# Patient Record
Sex: FEMALE | Race: OTHER | NOT HISPANIC OR LATINO
[De-identification: names, ages, dates, MRNs, and addresses within clinical notes are randomized per-mention and may not be internally consistent; named-entity substitution may affect disease eponyms.]

---

## 2018-02-28 PROBLEM — Z00.00 ENCOUNTER FOR PREVENTIVE HEALTH EXAMINATION: Status: ACTIVE | Noted: 2018-02-28

## 2018-03-29 ENCOUNTER — APPOINTMENT (OUTPATIENT)
Dept: OPHTHALMOLOGY | Facility: CLINIC | Age: 78
End: 2018-03-29

## 2018-12-20 ENCOUNTER — APPOINTMENT (OUTPATIENT)
Dept: OPHTHALMOLOGY | Facility: CLINIC | Age: 78
End: 2018-12-20

## 2019-07-31 ENCOUNTER — APPOINTMENT (OUTPATIENT)
Dept: PHYSICAL MEDICINE AND REHAB | Facility: CLINIC | Age: 79
End: 2019-07-31
Payer: MEDICARE

## 2019-07-31 VITALS — BODY MASS INDEX: 20.99 KG/M2 | HEIGHT: 58 IN | WEIGHT: 100 LBS

## 2019-07-31 DIAGNOSIS — M72.2 PLANTAR FASCIAL FIBROMATOSIS: ICD-10-CM

## 2019-07-31 PROCEDURE — 99204 OFFICE O/P NEW MOD 45 MIN: CPT

## 2019-07-31 NOTE — ASSESSMENT
[FreeTextEntry1] : We will begin with conservative management including physical therapy and oral OTC anti-inflammatories. If patient has not improved after 6 weeks consider MRI imaging at that time.\par

## 2019-07-31 NOTE — HISTORY OF PRESENT ILLNESS
[FreeTextEntry1] : PATIENT IS BEING SEEN FOR INITIAL EVALUATION FOR LOWER BACK PAIN. 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 LAYING DOWN FLAT, AND ICING AREA  ALLEVIATE THE PAIN. CURRENT PAIN LEVEL 4/10.

## 2019-07-31 NOTE — PHYSICAL EXAM
[Normal Station and Gait] : the gait and station were normal [Normal Motor Tone] : the muscle tone was normal [Involuntary Movements] : no involuntary movements were seen [Normal] : Oriented to person, place, and time, insight and judgement were intact and the affect was normal [de-identified] : No gross deformity, TTP of the lumbar paraspinals:right/bilaterally, ROM limited with flexion, SLR positive:right

## 2019-08-26 ENCOUNTER — APPOINTMENT (OUTPATIENT)
Dept: PHYSICAL MEDICINE AND REHAB | Facility: CLINIC | Age: 79
End: 2019-08-26
Payer: MEDICARE

## 2019-08-26 VITALS — HEIGHT: 58 IN | WEIGHT: 100 LBS | BODY MASS INDEX: 20.99 KG/M2

## 2019-08-26 PROCEDURE — 99213 OFFICE O/P EST LOW 20 MIN: CPT

## 2019-08-28 NOTE — HISTORY OF PRESENT ILLNESS
[FreeTextEntry1] : Location: low back pain\par Quality:sharp\par  current pain level 3/10\par Duration:2 months \par Context: patient states pain is radiating into her right hip and down the front of the leg \par Aggravating Factors: getting up, changing positions, lying down \par Conservative treatment:heat and ice, \par Associated Symptoms:  stiff \par Prior Studies:\par

## 2019-08-28 NOTE — ASSESSMENT
[FreeTextEntry1] : Did not like last PT place, before ordering an MRI will try PT with [facility] first.

## 2019-08-28 NOTE — PHYSICAL EXAM
[Normal Station and Gait] : the gait and station were normal [Normal Motor Tone] : the muscle tone was normal [Involuntary Movements] : no involuntary movements were seen [Normal] : Oriented to person, place, and time, insight and judgement were intact and the affect was normal [de-identified] : No gross deformity, TTP of the lumbar paraspinals:right/bilaterally, ROM limited with flexion, SLR positive:right

## 2019-11-21 ENCOUNTER — NON-APPOINTMENT (OUTPATIENT)
Age: 79
End: 2019-11-21

## 2019-11-21 ENCOUNTER — APPOINTMENT (OUTPATIENT)
Dept: OPHTHALMOLOGY | Facility: CLINIC | Age: 79
End: 2019-11-21
Payer: MEDICARE

## 2019-11-21 PROCEDURE — 76514 ECHO EXAM OF EYE THICKNESS: CPT

## 2019-11-21 PROCEDURE — 92004 COMPRE OPH EXAM NEW PT 1/>: CPT

## 2019-11-21 PROCEDURE — 76513 OPH US DX ANT SGM US UNI/BI: CPT | Mod: RT

## 2019-11-21 PROCEDURE — 92020 GONIOSCOPY: CPT

## 2019-11-21 PROCEDURE — 92083 EXTENDED VISUAL FIELD XM: CPT

## 2019-11-25 ENCOUNTER — NON-APPOINTMENT (OUTPATIENT)
Age: 79
End: 2019-11-25

## 2019-11-25 ENCOUNTER — APPOINTMENT (OUTPATIENT)
Dept: OPHTHALMOLOGY | Facility: CLINIC | Age: 79
End: 2019-11-25
Payer: MEDICARE

## 2019-11-25 PROCEDURE — 66761 REVISION OF IRIS: CPT | Mod: LT

## 2019-12-09 ENCOUNTER — APPOINTMENT (OUTPATIENT)
Dept: OPHTHALMOLOGY | Facility: CLINIC | Age: 79
End: 2019-12-09
Payer: MEDICARE

## 2019-12-09 ENCOUNTER — NON-APPOINTMENT (OUTPATIENT)
Age: 79
End: 2019-12-09

## 2019-12-09 PROCEDURE — 66761 REVISION OF IRIS: CPT | Mod: RT

## 2020-01-09 ENCOUNTER — APPOINTMENT (OUTPATIENT)
Dept: ORTHOPEDIC SURGERY | Facility: CLINIC | Age: 80
End: 2020-01-09

## 2020-01-22 ENCOUNTER — APPOINTMENT (OUTPATIENT)
Dept: PHYSICAL MEDICINE AND REHAB | Facility: CLINIC | Age: 80
End: 2020-01-22
Payer: MEDICARE

## 2020-01-22 ENCOUNTER — NON-APPOINTMENT (OUTPATIENT)
Age: 80
End: 2020-01-22

## 2020-01-22 VITALS — HEIGHT: 58 IN | WEIGHT: 100 LBS | BODY MASS INDEX: 20.99 KG/M2

## 2020-01-22 DIAGNOSIS — M47.817 SPONDYLOSIS W/OUT MYELOPATHY OR RADICULOPATHY, LUMBOSACRAL REGION: ICD-10-CM

## 2020-01-22 PROCEDURE — 99213 OFFICE O/P EST LOW 20 MIN: CPT

## 2020-01-22 NOTE — PHYSICAL EXAM
[Normal Station and Gait] : the gait and station were normal [Normal Motor Tone] : the muscle tone was normal [Involuntary Movements] : no involuntary movements were seen [Normal] : Deep tendon reflexes were 2+ and symmetric, the sensory exam was normal to light touch and pinprick and no focal deficits [de-identified] : No gross deformity, TTP of the lumbar paraspinals:right/bilaterally, ROM limited with flexion, SLR positive:right

## 2020-01-22 NOTE — HISTORY OF PRESENT ILLNESS
[FreeTextEntry1] : Patient presents for follow up and notes no significant improvement in pain. Has tried PT for a few weeks now but is not finding it very effective.

## 2020-01-22 NOTE — ASSESSMENT
[FreeTextEntry1] : Offered MRI of L spine, declined, Patient had questions on why this isn't hip related, physical exam done, no pain with IR/ER and she has FROM of her right hip. Her pain is mostly over the lower back with radiation to the side and front of the right leg. She also reports numbness. These are findings consistent with lumbar radiculitis, not hip intra-articular pathology. I offered her a hip xray to r/o hip OA if she still was unsure but I told her that given her advanced age, there may be some OA seen in the hip joint but based on her history and physical, it is unlikely that this is the cause of her symptoms. She would like to try PT for now. Also gave info on RFA which may help to treat her back pain symptoms. She is not interested in steroid injections (epidural).

## 2020-01-23 ENCOUNTER — APPOINTMENT (OUTPATIENT)
Dept: OPHTHALMOLOGY | Facility: CLINIC | Age: 80
End: 2020-01-23
Payer: MEDICARE

## 2020-01-23 ENCOUNTER — APPOINTMENT (OUTPATIENT)
Dept: OPHTHALMOLOGY | Facility: CLINIC | Age: 80
End: 2020-01-23

## 2020-01-23 ENCOUNTER — NON-APPOINTMENT (OUTPATIENT)
Age: 80
End: 2020-01-23

## 2020-01-23 PROCEDURE — 92250 FUNDUS PHOTOGRAPHY W/I&R: CPT

## 2020-01-23 PROCEDURE — 76513 OPH US DX ANT SGM US UNI/BI: CPT | Mod: RT

## 2020-01-23 PROCEDURE — 92014 COMPRE OPH EXAM EST PT 1/>: CPT

## 2020-01-23 PROCEDURE — 92020 GONIOSCOPY: CPT

## 2020-01-27 ENCOUNTER — APPOINTMENT (OUTPATIENT)
Dept: OTOLARYNGOLOGY | Facility: CLINIC | Age: 80
End: 2020-01-27
Payer: MEDICARE

## 2020-01-27 VITALS — WEIGHT: 105 LBS | BODY MASS INDEX: 22.04 KG/M2 | HEIGHT: 58 IN

## 2020-01-27 VITALS
HEART RATE: 73 BPM | TEMPERATURE: 98.1 F | OXYGEN SATURATION: 97 % | SYSTOLIC BLOOD PRESSURE: 165 MMHG | DIASTOLIC BLOOD PRESSURE: 77 MMHG

## 2020-01-27 PROCEDURE — 99203 OFFICE O/P NEW LOW 30 MIN: CPT | Mod: 25

## 2020-01-27 PROCEDURE — 31231 NASAL ENDOSCOPY DX: CPT

## 2020-01-27 NOTE — REVIEW OF SYSTEMS
[Patient Intake Form Reviewed] : Patient intake form was reviewed [Problem Snoring] : problem snoring [As Noted in HPI] : as noted in HPI [Eyesight Problems] : eyesight problems [Negative] : Heme/Lymph

## 2020-02-03 ENCOUNTER — OUTPATIENT (OUTPATIENT)
Dept: OUTPATIENT SERVICES | Facility: HOSPITAL | Age: 80
LOS: 1 days | End: 2020-02-03
Payer: MEDICARE

## 2020-02-03 ENCOUNTER — APPOINTMENT (OUTPATIENT)
Dept: SLEEP CENTER | Facility: HOSPITAL | Age: 80
End: 2020-02-03

## 2020-02-03 DIAGNOSIS — G47.33 OBSTRUCTIVE SLEEP APNEA (ADULT) (PEDIATRIC): ICD-10-CM

## 2020-02-03 PROCEDURE — 95810 POLYSOM 6/> YRS 4/> PARAM: CPT | Mod: 26

## 2020-02-03 PROCEDURE — 95810 POLYSOM 6/> YRS 4/> PARAM: CPT

## 2020-02-12 NOTE — HISTORY OF PRESENT ILLNESS
[Rhinoplasty] : rhinoplasty [de-identified] : 79 years old female patient with history of Obstructive sleep apnea consultation due to history of Glaucoma for 30 years.  Patient is present today in the office at the request of Dr. Etienne for consultation and evaluation of Snoring loud enough to disturb her sleep or that of others.  Patient with history of Parkinson for 7 years

## 2020-02-12 NOTE — REASON FOR VISIT
[Initial Consultation] : an initial consultation for [FreeTextEntry2] : Obstructive sleep apnea consultation due to history of Glaucoma for 30 years.  Patient states her level of severity is a level 1 out of 10 and it occurs constant.  Patient states nothing helps to improve or worsens her Obstructive sleep apnea consultation due to history of Glaucoma for 30 years.

## 2020-02-12 NOTE — CONSULT LETTER
[Dear  ___] : Dear  [unfilled], [Consult Letter:] : I had the pleasure of evaluating your patient, [unfilled]. [Please see my note below.] : Please see my note below. [Consult Closing:] : Thank you very much for allowing me to participate in the care of this patient.  If you have any questions, please do not hesitate to contact me. [Sincerely,] : Sincerely, [DrTerrence  ___] : Dr. GARCIA [FreeTextEntry3] : Preston Gongora MD, FACS\par Professor of Otolaryngology, [facility] [employer] [employer] [facility] [place]\par Director, [facility], [facility]\par , [facility], [facility]\par \par

## 2020-02-12 NOTE — PROCEDURE
[Congested] : congested [Deviated to the Rt] : deviated to the right [Image(s) Captured] : image(s) captured and filed [Topical Lidocaine] : topical lidocaine [Flexible Endoscope] : examined with the flexible endoscope [Serial Number: ___] : Serial Number: [unfilled] [de-identified] : WNL

## 2020-06-03 ENCOUNTER — APPOINTMENT (OUTPATIENT)
Dept: OPHTHALMOLOGY | Facility: CLINIC | Age: 80
End: 2020-06-03

## 2020-06-15 ENCOUNTER — NON-APPOINTMENT (OUTPATIENT)
Age: 80
End: 2020-06-15

## 2020-06-15 ENCOUNTER — APPOINTMENT (OUTPATIENT)
Dept: OPHTHALMOLOGY | Facility: CLINIC | Age: 80
End: 2020-06-15
Payer: MEDICARE

## 2020-06-15 PROCEDURE — 92250 FUNDUS PHOTOGRAPHY W/I&R: CPT

## 2020-06-15 PROCEDURE — 92014 COMPRE OPH EXAM EST PT 1/>: CPT

## 2020-06-15 PROCEDURE — 92020 GONIOSCOPY: CPT

## 2020-08-27 ENCOUNTER — APPOINTMENT (OUTPATIENT)
Dept: OPHTHALMOLOGY | Facility: CLINIC | Age: 80
End: 2020-08-27

## 2023-10-30 ENCOUNTER — APPOINTMENT (OUTPATIENT)
Dept: PHYSICAL MEDICINE AND REHAB | Facility: CLINIC | Age: 83
End: 2023-10-30
Payer: MEDICARE

## 2023-10-30 DIAGNOSIS — G20.A1 PARKINSON'S DISEASE WITHOUT DYSKINESIA, WITHOUT MENTION OF FLUCTUATIONS: ICD-10-CM

## 2023-10-30 DIAGNOSIS — M54.16 RADICULOPATHY, LUMBAR REGION: ICD-10-CM

## 2023-10-30 DIAGNOSIS — M79.18 MYALGIA, OTHER SITE: ICD-10-CM

## 2023-10-30 PROCEDURE — 72100 X-RAY EXAM L-S SPINE 2/3 VWS: CPT

## 2023-10-30 PROCEDURE — 99204 OFFICE O/P NEW MOD 45 MIN: CPT

## 2023-10-30 RX ORDER — ROSUVASTATIN CALCIUM 5 MG/1
TABLET, FILM COATED ORAL
Refills: 0 | Status: ACTIVE | COMMUNITY

## 2023-10-30 RX ORDER — MELOXICAM 7.5 MG/1
7.5 TABLET ORAL TWICE DAILY
Qty: 20 | Refills: 0 | Status: ACTIVE | COMMUNITY
Start: 2023-10-30 | End: 1900-01-01

## 2023-10-30 RX ORDER — CARBIDOPA AND LEVODOPA 25; 100 MG/1; MG/1
TABLET ORAL
Refills: 0 | Status: ACTIVE | COMMUNITY

## 2023-10-30 RX ORDER — LOSARTAN POTASSIUM 100 MG/1
TABLET, FILM COATED ORAL
Refills: 0 | Status: ACTIVE | COMMUNITY

## 2023-11-02 ENCOUNTER — NON-APPOINTMENT (OUTPATIENT)
Age: 83
End: 2023-11-02

## 2023-11-02 DIAGNOSIS — M51.26 OTHER INTERVERTEBRAL DISC DISPLACEMENT, LUMBAR REGION: ICD-10-CM

## 2023-11-20 ENCOUNTER — APPOINTMENT (OUTPATIENT)
Dept: PHYSICAL MEDICINE AND REHAB | Facility: CLINIC | Age: 83
End: 2023-11-20